# Patient Record
Sex: MALE | Race: WHITE | Employment: FULL TIME | URBAN - METROPOLITAN AREA
[De-identification: names, ages, dates, MRNs, and addresses within clinical notes are randomized per-mention and may not be internally consistent; named-entity substitution may affect disease eponyms.]

---

## 2018-08-10 ENCOUNTER — HOSPITAL ENCOUNTER (OUTPATIENT)
Age: 43
Discharge: ANOTHER HOSPITAL | End: 2018-08-10
Attending: FAMILY MEDICINE
Payer: COMMERCIAL

## 2018-08-10 ENCOUNTER — APPOINTMENT (OUTPATIENT)
Dept: GENERAL RADIOLOGY | Facility: HOSPITAL | Age: 43
DRG: 316 | End: 2018-08-10
Attending: EMERGENCY MEDICINE
Payer: COMMERCIAL

## 2018-08-10 ENCOUNTER — HOSPITAL ENCOUNTER (INPATIENT)
Facility: HOSPITAL | Age: 43
LOS: 1 days | Discharge: HOME | DRG: 316 | End: 2018-08-11
Attending: EMERGENCY MEDICINE | Admitting: HOSPITALIST
Payer: COMMERCIAL

## 2018-08-10 VITALS
SYSTOLIC BLOOD PRESSURE: 131 MMHG | HEART RATE: 80 BPM | TEMPERATURE: 98 F | OXYGEN SATURATION: 100 % | RESPIRATION RATE: 16 BRPM | DIASTOLIC BLOOD PRESSURE: 73 MMHG

## 2018-08-10 DIAGNOSIS — R07.9 ACUTE CHEST PAIN: ICD-10-CM

## 2018-08-10 DIAGNOSIS — R07.89 CHEST PAIN, ATYPICAL: Primary | ICD-10-CM

## 2018-08-10 DIAGNOSIS — I31.9 PERICARDITIS, UNSPECIFIED CHRONICITY, UNSPECIFIED TYPE: ICD-10-CM

## 2018-08-10 DIAGNOSIS — I21.4 NON-STEMI (NON-ST ELEVATED MYOCARDIAL INFARCTION) (HCC): Primary | ICD-10-CM

## 2018-08-10 LAB
ANION GAP SERPL CALC-SCNC: 7 MMOL/L (ref 0–18)
BASOPHILS # BLD: 0 K/UL (ref 0–0.2)
BASOPHILS NFR BLD: 1 %
BUN SERPL-MCNC: 10 MG/DL (ref 8–20)
BUN/CREAT SERPL: 12.5 (ref 10–20)
CALCIUM SERPL-MCNC: 9.2 MG/DL (ref 8.5–10.5)
CHLORIDE SERPL-SCNC: 102 MMOL/L (ref 95–110)
CHOLEST SERPL-MCNC: 172 MG/DL (ref 110–200)
CO2 SERPL-SCNC: 30 MMOL/L (ref 22–32)
CREAT SERPL-MCNC: 0.8 MG/DL (ref 0.5–1.5)
EOSINOPHIL # BLD: 0.2 K/UL (ref 0–0.7)
EOSINOPHIL NFR BLD: 3 %
ERYTHROCYTE [DISTWIDTH] IN BLOOD BY AUTOMATED COUNT: 12.8 % (ref 11–15)
GLUCOSE SERPL-MCNC: 100 MG/DL (ref 70–99)
HCT VFR BLD AUTO: 40.9 % (ref 41–52)
HDLC SERPL-MCNC: 48 MG/DL
HGB BLD-MCNC: 14.1 G/DL (ref 13.5–17.5)
LDLC SERPL CALC-MCNC: 111 MG/DL (ref 0–99)
LYMPHOCYTES # BLD: 1.9 K/UL (ref 1–4)
LYMPHOCYTES NFR BLD: 31 %
MCH RBC QN AUTO: 31 PG (ref 27–32)
MCHC RBC AUTO-ENTMCNC: 34.4 G/DL (ref 32–37)
MCV RBC AUTO: 90.2 FL (ref 80–100)
MONOCYTES # BLD: 0.8 K/UL (ref 0–1)
MONOCYTES NFR BLD: 13 %
NEUTROPHILS # BLD AUTO: 3.3 K/UL (ref 1.8–7.7)
NEUTROPHILS NFR BLD: 53 %
NONHDLC SERPL-MCNC: 124 MG/DL
OSMOLALITY UR CALC.SUM OF ELEC: 287 MOSM/KG (ref 275–295)
PLATELET # BLD AUTO: 280 K/UL (ref 140–400)
PMV BLD AUTO: 8.3 FL (ref 7.4–10.3)
POTASSIUM SERPL-SCNC: 3.5 MMOL/L (ref 3.3–5.1)
RBC # BLD AUTO: 4.53 M/UL (ref 4.5–5.9)
SODIUM SERPL-SCNC: 139 MMOL/L (ref 136–144)
TRIGL SERPL-MCNC: 67 MG/DL (ref 1–149)
TROPONIN I SERPL-MCNC: 3.12 NG/ML (ref ?–0.03)
TROPONIN I SERPL-MCNC: 3.62 NG/ML (ref ?–0.03)
WBC # BLD AUTO: 6.2 K/UL (ref 4–11)

## 2018-08-10 PROCEDURE — 99214 OFFICE O/P EST MOD 30 MIN: CPT

## 2018-08-10 PROCEDURE — 71045 X-RAY EXAM CHEST 1 VIEW: CPT | Performed by: EMERGENCY MEDICINE

## 2018-08-10 PROCEDURE — 93005 ELECTROCARDIOGRAM TRACING: CPT

## 2018-08-10 PROCEDURE — 99223 1ST HOSP IP/OBS HIGH 75: CPT | Performed by: HOSPITALIST

## 2018-08-10 PROCEDURE — 93010 ELECTROCARDIOGRAM REPORT: CPT | Performed by: FAMILY MEDICINE

## 2018-08-10 RX ORDER — ACETAMINOPHEN 325 MG/1
650 TABLET ORAL EVERY 6 HOURS PRN
Status: DISCONTINUED | OUTPATIENT
Start: 2018-08-10 | End: 2018-08-11

## 2018-08-10 RX ORDER — ASPIRIN 325 MG
325 TABLET ORAL DAILY
Status: DISCONTINUED | OUTPATIENT
Start: 2018-08-10 | End: 2018-08-11

## 2018-08-10 RX ORDER — ONDANSETRON 2 MG/ML
4 INJECTION INTRAMUSCULAR; INTRAVENOUS EVERY 6 HOURS PRN
Status: DISCONTINUED | OUTPATIENT
Start: 2018-08-10 | End: 2018-08-11

## 2018-08-10 RX ORDER — ASPIRIN 325 MG
325 TABLET ORAL ONCE
Status: COMPLETED | OUTPATIENT
Start: 2018-08-10 | End: 2018-08-10

## 2018-08-10 RX ORDER — KETOROLAC TROMETHAMINE 30 MG/ML
30 INJECTION, SOLUTION INTRAMUSCULAR; INTRAVENOUS ONCE
Status: COMPLETED | OUTPATIENT
Start: 2018-08-10 | End: 2018-08-10

## 2018-08-10 RX ORDER — NITROGLYCERIN 0.4 MG/1
0.4 TABLET SUBLINGUAL EVERY 5 MIN PRN
Status: DISCONTINUED | OUTPATIENT
Start: 2018-08-10 | End: 2018-08-11

## 2018-08-10 RX ORDER — IBUPROFEN 400 MG/1
600 TABLET ORAL EVERY 4 HOURS PRN
Status: DISCONTINUED | OUTPATIENT
Start: 2018-08-10 | End: 2018-08-11

## 2018-08-10 RX ORDER — SODIUM CHLORIDE 0.9 % (FLUSH) 0.9 %
3 SYRINGE (ML) INJECTION AS NEEDED
Status: DISCONTINUED | OUTPATIENT
Start: 2018-08-10 | End: 2018-08-11

## 2018-08-10 RX ORDER — IBUPROFEN 400 MG/1
400 TABLET ORAL EVERY 4 HOURS PRN
Status: DISCONTINUED | OUTPATIENT
Start: 2018-08-10 | End: 2018-08-11

## 2018-08-10 RX ORDER — IBUPROFEN 400 MG/1
200 TABLET ORAL EVERY 4 HOURS PRN
Status: DISCONTINUED | OUTPATIENT
Start: 2018-08-10 | End: 2018-08-11

## 2018-08-10 NOTE — ED INITIAL ASSESSMENT (HPI)
Pt with hx of pericarditis states \"I had a four hour episode of chest pain yesterday. \" took ibuprofen with no relief. Mild chest pain at this time that radiates down L arm. Denies shortness of breath.

## 2018-08-10 NOTE — CONSULTS
[facility]    Cardiology Consultation    Sonny Pena Patient Status:  Emergency    1975 MRN [id number]   Location [address] Attending Kenia Arteaga MD   Hosp Day # 0 PCP None Pcp     8/10/2018 and denies heartburn  NEURO: denies headaches  All other systems reviewed and negative.     BP (!) 122/92   Pulse 66   Temp 97.9 °F (36.6 °C) (Oral)   Resp 14   SpO2 98%   Temp (24hrs), Av °F (36.7 °C), Min:97.9 °F (36.6 °C), Max:98 °F (36.7 °C)     No When compared with ECG of 11/24/2015 13:53:54 Early repolarization is not seen Electronically signed on 08/10/2018 at 15:14 by Vicky Andrade MD      Impression:  Patient Active Problem List:     Non-STEMI (non-ST elevated myocardial infarction) ([facility] [address]

## 2018-08-10 NOTE — ED PROVIDER NOTES
Pt seen in [facility] [place]      Stated Complaint: Patient presents with:  Chest Pain Angina (cardiovascular)      --------------   RN assessment:     Chest pain-4 h bout yesterday, h/o pericarditis  --------------    CC: chest negative for  jaundice and odynophagia  Genitourinary:negative for decreased stream and nocturia  Behavioral/Psych: negative for aggressive behavior and hallucinations  10 point review of systems is otherwise negative.     Objective   Vitals Ranges and Last d/w:  Therapeutic plan as noted  All questions answered, pt verbalizes understanding  F/u w/PCP advised    ----------------------------------------------     Clinical Impression:    Chest pain, atypical  (primary encounter diagnosis)    Disposition: ER, pr

## 2018-08-10 NOTE — ED PROVIDER NOTES
Patient Seen in: [facility] Emergency Department    History   Patient presents with:  Chest Pain Angina (cardiovascular)    Stated Complaint: Chest pain    HPI    Patient presents to the emergency department with complaint of chest pain that woke °F (36.6 °C)  Temp src: Oral  SpO2: 100 %  O2 Device: None (Room air)    Current:/77 (BP Location: Right arm)   Pulse 67   Temp 98 °F (36.7 °C) (Oral)   Resp 20   Ht 180.3 cm (5' 11\")   Wt 67.1 kg   SpO2 97%   BMI 20.64 kg/m²         Physical Exam PANEL - Abnormal; Notable for the following:     LDL Cholesterol 111 (*)     All other components within normal limits   TROPONIN I - Abnormal; Notable for the following:     Troponin 3.12 (*)     All other components within normal limits   TROPONIN I - Ab care with a primary care provider within the next three months to obtain basic health screening including reassessment of your blood pressure.       Clinical Impression:  Non-STEMI (non-ST elevated myocardial infarction) ([facility] [address]  (primary encounter diagnosis)

## 2018-08-10 NOTE — ED INITIAL ASSESSMENT (HPI)
Pt presents to the ED sent from [address] [place] [address] for the report of chest pain since 0200 hrs. Reports hx of pericarditis 3 years ago.

## 2018-08-10 NOTE — ED NOTES
Pt to the ed with mid sternal chest pain that radiates down his left arm that started around 0200 this morning. Pt denies any sob or dizziness. Dr rossi for eval. Will cont to monitor. Hx of pericarditis.

## 2018-08-10 NOTE — HISTORICAL OFFICE NOTE
Kylee Logan  : 1975  ACCOUNT:  [id number]  [phone]  PCP:    TODAY'S DATE: 2015  DICTATED BY:  LISA Lawrence]    CHIEF COMPLAINT: [post cath wrist check.]    HPI: [On 2015, David Palumbo, a 46year old male, presented with no rashes, lesions. MS: no limiting arthritis. NEURO: denies lightheadedness or dizziness. HEM/LYMPH: denies easy bruising. ALL: no new food or enviornmental allergies.       PAST HISTORY: GERD    PAST CV HISTORY: Acute Coronary Syndrome    FAMILY HISTORY: Sig to explore other reasons for the chest discomfort. It was also discussed that no clots were found on the CT of his chest, however, he should follow through with the recommended blood testing and do that through a primary care physician.      PLAN:    [1. Co

## 2018-08-11 ENCOUNTER — APPOINTMENT (OUTPATIENT)
Dept: CV DIAGNOSTICS | Facility: HOSPITAL | Age: 43
DRG: 316 | End: 2018-08-11
Attending: INTERNAL MEDICINE
Payer: COMMERCIAL

## 2018-08-11 VITALS
SYSTOLIC BLOOD PRESSURE: 113 MMHG | BODY MASS INDEX: 20.72 KG/M2 | OXYGEN SATURATION: 97 % | RESPIRATION RATE: 16 BRPM | DIASTOLIC BLOOD PRESSURE: 73 MMHG | HEART RATE: 69 BPM | WEIGHT: 148 LBS | TEMPERATURE: 98 F | HEIGHT: 71 IN

## 2018-08-11 LAB
ANION GAP SERPL CALC-SCNC: 5 MMOL/L (ref 0–18)
BASOPHILS # BLD: 0 K/UL (ref 0–0.2)
BASOPHILS NFR BLD: 1 %
BUN SERPL-MCNC: 13 MG/DL (ref 8–20)
BUN/CREAT SERPL: 14.9 (ref 10–20)
CALCIUM SERPL-MCNC: 8.8 MG/DL (ref 8.5–10.5)
CHLORIDE SERPL-SCNC: 104 MMOL/L (ref 95–110)
CO2 SERPL-SCNC: 30 MMOL/L (ref 22–32)
CREAT SERPL-MCNC: 0.87 MG/DL (ref 0.5–1.5)
EOSINOPHIL # BLD: 0.3 K/UL (ref 0–0.7)
EOSINOPHIL NFR BLD: 4 %
ERYTHROCYTE [DISTWIDTH] IN BLOOD BY AUTOMATED COUNT: 12.8 % (ref 11–15)
GLUCOSE SERPL-MCNC: 94 MG/DL (ref 70–99)
HCT VFR BLD AUTO: 37.4 % (ref 41–52)
HGB BLD-MCNC: 12.8 G/DL (ref 13.5–17.5)
LYMPHOCYTES # BLD: 1.9 K/UL (ref 1–4)
LYMPHOCYTES NFR BLD: 27 %
MCH RBC QN AUTO: 30.7 PG (ref 27–32)
MCHC RBC AUTO-ENTMCNC: 34.2 G/DL (ref 32–37)
MCV RBC AUTO: 90 FL (ref 80–100)
MONOCYTES # BLD: 0.7 K/UL (ref 0–1)
MONOCYTES NFR BLD: 10 %
NEUTROPHILS # BLD AUTO: 4.1 K/UL (ref 1.8–7.7)
NEUTROPHILS NFR BLD: 57 %
OSMOLALITY UR CALC.SUM OF ELEC: 288 MOSM/KG (ref 275–295)
PLATELET # BLD AUTO: 272 K/UL (ref 140–400)
PMV BLD AUTO: 8.6 FL (ref 7.4–10.3)
POTASSIUM SERPL-SCNC: 4.2 MMOL/L (ref 3.3–5.1)
RBC # BLD AUTO: 4.15 M/UL (ref 4.5–5.9)
SODIUM SERPL-SCNC: 139 MMOL/L (ref 136–144)
TROPONIN I SERPL-MCNC: 4.49 NG/ML (ref ?–0.03)
WBC # BLD AUTO: 7.1 K/UL (ref 4–11)

## 2018-08-11 PROCEDURE — 99239 HOSP IP/OBS DSCHRG MGMT >30: CPT | Performed by: HOSPITALIST

## 2018-08-11 PROCEDURE — 93306 TTE W/DOPPLER COMPLETE: CPT | Performed by: INTERNAL MEDICINE

## 2018-08-11 RX ORDER — IBUPROFEN 600 MG/1
600 TABLET ORAL 3 TIMES DAILY
Qty: 30 TABLET | Refills: 0 | Status: SHIPPED | OUTPATIENT
Start: 2018-08-11

## 2018-08-11 RX ORDER — DILTIAZEM HYDROCHLORIDE 120 MG/1
120 CAPSULE, COATED, EXTENDED RELEASE ORAL DAILY
Status: DISCONTINUED | OUTPATIENT
Start: 2018-08-11 | End: 2018-08-11

## 2018-08-11 RX ORDER — IBUPROFEN 400 MG/1
600 TABLET ORAL EVERY 6 HOURS
Status: DISCONTINUED | OUTPATIENT
Start: 2018-08-11 | End: 2018-08-11

## 2018-08-11 NOTE — PLAN OF CARE
Problem: Patient/Family Goals  Goal: Patient/Family Long Term Goal  Patient's Long Term Goal: remain free from chest pain    Interventions:  - monitor vitals  - follow up with PCP.      - See additional Care Plan goals for specific interventions   Outcome:

## 2018-08-11 NOTE — H&P
[facility]    PATIENT'S NAME: Bre Eldridge   ATTENDING PHYSICIAN: Luis Enrique Griffith MD   PATIENT ACCOUNT#:   [id number]    LOCATION:  [place] [address]  MEDICAL RECORD #:   [id number]       YOB: 1975  ADMISSION DATE:       08/10/201 122/92, pulse ox 98% on room air. HEENT:  Atraumatic. Oropharynx clear. Moist mucous membranes. Normal hard and soft palate. NECK:  Trachea midline. Full range of motion. Supple. No thyromegaly or lymphadenopathy.    LUNGS:  Clear to auscultation

## 2018-08-11 NOTE — DISCHARGE SUMMARY
[facility]    Discharge Summary    [facility] Patient Status:  Inpatient    1975 MRN [id number]   Location [facility] 3W/SW Attending Erich Redding MD   [facility] Day # 1 PCP None Pcp     Date of Admission: 8/10/2018 Condition: Stable    Discharge Medications:      Discharge Medications      START taking these medications      Instructions Prescription details   ibuprofen 600 MG Tabs  Commonly known as:  MOTRIN      Take 1 tablet (600 mg total) by mouth 3 (three) times

## 2018-08-11 NOTE — PROGRESS NOTES
[facility] Progress Note      Richiekiko Gonzalezmarco Patient Status:  Inpatient    1975 MRN [id number]   Location [facility] 3W/SW Attending Patsy Walker MD   Hosp Day # 1 PCP None Pcp       Assess GFRNAA  >60  >60   CA  9.2  8.8   NA  139  139   K  3.5  4.2   CL  102  104   CO2  30  30      Recent Labs   Lab  08/10/18   1611  08/11/18   0558   RBC  4.53  4.15*   HGB  14.1  12.8*   HCT  40.9*  37.4*   MCV  90.2  90.0   MCH  31.0  30.7   MCHC  34.4

## 2018-08-12 NOTE — TRANSITION NOTE
56-year-old man admitted to [facility] on 1818 with pleuritic chest pain. No sed rate was done but his troponins were 3.6, 3.1, and 4.5. EKGs were normal ×3.   DT skin was unremarkable and there was no effusion on that or the echocardiogram.  He ha